# Patient Record
Sex: FEMALE | Race: BLACK OR AFRICAN AMERICAN | NOT HISPANIC OR LATINO | Employment: STUDENT | URBAN - METROPOLITAN AREA
[De-identification: names, ages, dates, MRNs, and addresses within clinical notes are randomized per-mention and may not be internally consistent; named-entity substitution may affect disease eponyms.]

---

## 2021-09-17 ENCOUNTER — TELEPHONE (OUTPATIENT)
Dept: OBSTETRICS AND GYNECOLOGY | Facility: CLINIC | Age: 21
End: 2021-09-17

## 2021-11-29 ENCOUNTER — TELEPHONE (OUTPATIENT)
Dept: OBSTETRICS AND GYNECOLOGY | Facility: CLINIC | Age: 21
End: 2021-11-29
Payer: COMMERCIAL

## 2021-11-29 RX ORDER — NORETHINDRONE ACETATE AND ETHINYL ESTRADIOL, AND FERROUS FUMARATE 1MG-20(24)
KIT ORAL
Qty: 28 CAPSULE | Refills: 0 | Status: SHIPPED | OUTPATIENT
Start: 2021-11-29 | End: 2021-12-21 | Stop reason: SDUPTHER

## 2021-12-21 ENCOUNTER — OFFICE VISIT (OUTPATIENT)
Dept: OBSTETRICS AND GYNECOLOGY | Facility: CLINIC | Age: 21
End: 2021-12-21
Payer: COMMERCIAL

## 2021-12-21 VITALS
SYSTOLIC BLOOD PRESSURE: 122 MMHG | BODY MASS INDEX: 19.93 KG/M2 | DIASTOLIC BLOOD PRESSURE: 70 MMHG | HEIGHT: 65 IN | WEIGHT: 119.63 LBS

## 2021-12-21 DIAGNOSIS — Z00.00 ENCOUNTER FOR WELLNESS EXAMINATION: Primary | ICD-10-CM

## 2021-12-21 DIAGNOSIS — Z30.09 ENCOUNTER FOR COUNSELING REGARDING CONTRACEPTION: ICD-10-CM

## 2021-12-21 PROCEDURE — 99395 PR PREVENTIVE VISIT,EST,18-39: ICD-10-PCS | Mod: S$GLB,,, | Performed by: NURSE PRACTITIONER

## 2021-12-21 PROCEDURE — 99395 PREV VISIT EST AGE 18-39: CPT | Mod: S$GLB,,, | Performed by: NURSE PRACTITIONER

## 2021-12-21 RX ORDER — NORETHINDRONE ACETATE AND ETHINYL ESTRADIOL, AND FERROUS FUMARATE 1MG-20(24)
KIT ORAL
Qty: 84 CAPSULE | Refills: 4 | Status: SHIPPED | OUTPATIENT
Start: 2021-12-21 | End: 2023-02-15

## 2023-03-13 ENCOUNTER — OFFICE VISIT (OUTPATIENT)
Dept: OBSTETRICS AND GYNECOLOGY | Facility: CLINIC | Age: 23
End: 2023-03-13
Payer: COMMERCIAL

## 2023-03-13 VITALS
DIASTOLIC BLOOD PRESSURE: 86 MMHG | SYSTOLIC BLOOD PRESSURE: 166 MMHG | HEIGHT: 65 IN | BODY MASS INDEX: 21.01 KG/M2 | HEART RATE: 132 BPM | WEIGHT: 126.13 LBS

## 2023-03-13 DIAGNOSIS — Z30.41 ENCOUNTER FOR SURVEILLANCE OF CONTRACEPTIVE PILLS: ICD-10-CM

## 2023-03-13 DIAGNOSIS — Z01.419 WELL WOMAN EXAM: Primary | ICD-10-CM

## 2023-03-13 PROCEDURE — 1159F MED LIST DOCD IN RCRD: CPT | Mod: S$GLB,,, | Performed by: NURSE PRACTITIONER

## 2023-03-13 PROCEDURE — 3077F PR MOST RECENT SYSTOLIC BLOOD PRESSURE >= 140 MM HG: ICD-10-PCS | Mod: S$GLB,,, | Performed by: NURSE PRACTITIONER

## 2023-03-13 PROCEDURE — 3079F PR MOST RECENT DIASTOLIC BLOOD PRESSURE 80-89 MM HG: ICD-10-PCS | Mod: S$GLB,,, | Performed by: NURSE PRACTITIONER

## 2023-03-13 PROCEDURE — 88175 CYTOPATH C/V AUTO FLUID REDO: CPT | Performed by: NURSE PRACTITIONER

## 2023-03-13 PROCEDURE — 99395 PREV VISIT EST AGE 18-39: CPT | Mod: S$GLB,,, | Performed by: NURSE PRACTITIONER

## 2023-03-13 PROCEDURE — 3008F BODY MASS INDEX DOCD: CPT | Mod: S$GLB,,, | Performed by: NURSE PRACTITIONER

## 2023-03-13 PROCEDURE — 1159F PR MEDICATION LIST DOCUMENTED IN MEDICAL RECORD: ICD-10-PCS | Mod: S$GLB,,, | Performed by: NURSE PRACTITIONER

## 2023-03-13 PROCEDURE — 3008F PR BODY MASS INDEX (BMI) DOCUMENTED: ICD-10-PCS | Mod: S$GLB,,, | Performed by: NURSE PRACTITIONER

## 2023-03-13 PROCEDURE — 1160F RVW MEDS BY RX/DR IN RCRD: CPT | Mod: S$GLB,,, | Performed by: NURSE PRACTITIONER

## 2023-03-13 PROCEDURE — 99395 PR PREVENTIVE VISIT,EST,18-39: ICD-10-PCS | Mod: S$GLB,,, | Performed by: NURSE PRACTITIONER

## 2023-03-13 PROCEDURE — 3079F DIAST BP 80-89 MM HG: CPT | Mod: S$GLB,,, | Performed by: NURSE PRACTITIONER

## 2023-03-13 PROCEDURE — 1160F PR REVIEW ALL MEDS BY PRESCRIBER/CLIN PHARMACIST DOCUMENTED: ICD-10-PCS | Mod: S$GLB,,, | Performed by: NURSE PRACTITIONER

## 2023-03-13 PROCEDURE — 3077F SYST BP >= 140 MM HG: CPT | Mod: S$GLB,,, | Performed by: NURSE PRACTITIONER

## 2023-03-13 RX ORDER — NORETHINDRONE ACETATE AND ETHINYL ESTRADIOL, AND FERROUS FUMARATE 1MG-20(24)
KIT ORAL
Qty: 84 CAPSULE | Refills: 3 | Status: SHIPPED | OUTPATIENT
Start: 2023-03-13

## 2023-03-13 NOTE — PROGRESS NOTES
"CC: Well woman exam    Bev Braga is a 22 y.o. female  presents for well woman exam.  LMP: Patient's last menstrual period was 2023..  No issues, problems, or complaints. Patients last pap was n/a, underage.  Last wellness labs were done n/a.  SBE-yes. She is a non smoker . Denies any anxiety and depression. She uses a seat belt while riding in the car.  Eating well and exercising.  no sexually active.The current method of family planning is OCP (estrogen/progesterone).       Chief Complaint   Patient presents with    Well Woman        Past Medical History:   Diagnosis Date    Asthma      History reviewed. No pertinent surgical history.  Social History     Socioeconomic History    Marital status: Single   Tobacco Use    Smoking status: Never    Smokeless tobacco: Never   Substance and Sexual Activity    Alcohol use: Never    Drug use: Never    Sexual activity: Never     History reviewed. No pertinent family history.  OB History          0    Para   0    Term   0       0    AB   0    Living   0         SAB   0    IAB   0    Ectopic   0    Multiple   0    Live Births   0               Current Outpatient Medications on File Prior to Visit   Medication Sig Dispense Refill    [DISCONTINUED] norethindrone-e.estradioL-iron (GEMMILY) 1 mg-20 mcg (24)/75 mg (4) Cap TAKE 1 CAPSULE BY MOUTH AT THE SAME TIME EVERY DAY 84 capsule 0     No current facility-administered medications on file prior to visit.        ROS:  Review of Systems   Constitutional: Negative.    HENT: Negative.     Eyes: Negative.    Respiratory: Negative.     Cardiovascular: Negative.    Gastrointestinal: Negative.    Endocrine: Negative.    Genitourinary: Negative.    Musculoskeletal: Negative.    Integumentary:  Negative.   Neurological: Negative.    Hematological: Negative.    Psychiatric/Behavioral: Negative.     All other systems reviewed and are negative.  Breast: negative.       BP (!) 166/86   Pulse (!) 132   Ht 5' 5" " (1.651 m)   Wt 57.2 kg (126 lb 1.6 oz)   LMP 02/16/2023   BMI 20.98 kg/m²     PHYSICAL EXAM:  Physical Exam:   Constitutional: She is oriented to person, place, and time. Vital signs are normal. She appears well-developed and well-nourished.    HENT:   Head: Normocephalic and atraumatic.   Nose: Nose normal.   Mouth/Throat: Oropharynx is clear and moist.    Eyes: Pupils are equal, round, and reactive to light. Conjunctivae and EOM are normal.     Cardiovascular:  Normal rate, regular rhythm, normal heart sounds, intact distal pulses and normal pulses.             Pulmonary/Chest: Effort normal and breath sounds normal.   Normal breast exam bilaterally         Abdominal: Soft. Bowel sounds are normal.     Genitourinary:    Inguinal canal, vagina, uterus, right adnexa, left adnexa and rectum normal.      Pelvic exam was performed with patient supine.   The external female genitalia was normal.   Genitalia hair distrobution normal .   Labial bartholins normal.Cervix is normal. Vagina was moist.   pap smear completed          Musculoskeletal: Normal range of motion and moves all extremeties.      Right lower leg: No edema.      Left lower leg: No edema.       Neurological: She is alert and oriented to person, place, and time. She has normal reflexes.    Skin: Skin is warm and dry.    Psychiatric: She has a normal mood and affect. Her behavior is normal. Judgment and thought content normal.      Well woman exam  -     Liquid-Based Pap Smear, Screening    Encounter for surveillance of contraceptive pills  -     norethindrone-e.estradioL-iron (GEMMILY) 1 mg-20 mcg (24)/75 mg (4) Cap; TAKE 1 CAPSULE BY MOUTH AT THE SAME TIME EVERY DAY  Dispense: 84 capsule; Refill: 3      No orders of the defined types were placed in this encounter.       Patient was counseled today on A.C.S. Pap guidelines and recommendations for yearly pelvic exams, mammograms and monthly self breast exams; to see her PCP for other health maintenance.   Contraception was discussed with the patient she expressed understanding.  STD education counseling was performed during this visit patient expressed understanding. RTC for annual in 1 year or before for any GYN problems/concerns.     No follow-ups on file.      Gisella Brewer, BOLIVARC

## 2023-03-17 LAB
FINAL PATHOLOGIC DIAGNOSIS: NORMAL
Lab: NORMAL